# Patient Record
Sex: MALE | Race: WHITE | NOT HISPANIC OR LATINO
[De-identification: names, ages, dates, MRNs, and addresses within clinical notes are randomized per-mention and may not be internally consistent; named-entity substitution may affect disease eponyms.]

---

## 2023-03-10 ENCOUNTER — NON-APPOINTMENT (OUTPATIENT)
Age: 23
End: 2023-03-10

## 2023-03-10 ENCOUNTER — APPOINTMENT (OUTPATIENT)
Dept: NEUROLOGY | Facility: CLINIC | Age: 23
End: 2023-03-10
Payer: COMMERCIAL

## 2023-03-10 VITALS
HEART RATE: 67 BPM | DIASTOLIC BLOOD PRESSURE: 68 MMHG | TEMPERATURE: 98.3 F | RESPIRATION RATE: 16 BRPM | SYSTOLIC BLOOD PRESSURE: 113 MMHG

## 2023-03-10 DIAGNOSIS — R51.9 HEADACHE, UNSPECIFIED: ICD-10-CM

## 2023-03-10 DIAGNOSIS — S06.0X0A CONCUSSION W/OUT LOSS OF CONSCIOUSNESS, INITIAL ENCOUNTER: ICD-10-CM

## 2023-03-10 PROBLEM — Z00.00 ENCOUNTER FOR PREVENTIVE HEALTH EXAMINATION: Status: ACTIVE | Noted: 2023-03-10

## 2023-03-10 PROCEDURE — 99204 OFFICE O/P NEW MOD 45 MIN: CPT

## 2023-03-10 NOTE — HISTORY OF PRESENT ILLNESS
[FreeTextEntry1] : This 23 year old right handed man is self-referred for my advice and opinion regarding concussion\par \par He has no significant PMH.  Today was walking down stairs to basement and hit top of head on low hanging pipe.  He did not pass out, but felt woozy and needed to lay down at once.  He had photophobia, moderate intensity headache and trouble focusing for a time, but then improved. Now has dull headache 3/10 and mild residual photophobia.  Feels a bit woozy with position change.  \par \par \par PMH:  None\par \par PSH: none\par \par SHX:  college student.  Nonsmoker.  nondrinker.  \par \par FHX:  no neurologic issues.

## 2023-03-10 NOTE — PHYSICAL EXAM
[FreeTextEntry1] : \par Exam as below (with documented exceptions in parentheses):\par \par General:  Healthy appearing man well groomed and without deformities. KPS is 90\par \par Mental Status:  Awake, alert and attentive. Oriented to person, place and time. Recent and remote memory intact. Normal concentration. Fluent spontaneous speech with intact naming and repetition. Normal fund of knowledge.  \par \par Cranial Nerves: II: Full visual fields. III,IV,VI:Pupils round, reactive to light. Full extraocular movements. No nystagmus. V: Normal bilateral bite, facial sensation. VII: No facial weakness. VIII: Hearing intact. IX,X: Palate midline, intact gag. XI:  Sternocleidomastoids normal. XII: Tongue protrudes midline. No dysarthria. \par \par Motor:  Normal tone, bulk and power throughout including arms and legs, proximal and distal. No pronator drift. No abnormal movements. \par \par Sensation: Normal in arms, legs and trunk to pin, proprioception and vibration. Negative Romberg. \par \par Coordination: No dysmetria or dysdiadochokinesis bilaterally. Normal heel-shin testing. \par \par Gait: Normal including heel and toe walking. Normal station. \par \par Reflexes: Normoactive and symmetric throughout. Absent Babinski bilaterally. \par \par Vascular: No peripheral edema/calf tenderness. \par \par Eyes: Funduscopic exam without papilledema\par \par Heart: Regular rate and rhythm. Normal s1-s2. No murmurs, rubs or gallops. \par \par Respiratory: Lungs clear to auscultation bilaterally. \par \par Abdomen: Nontender, non-distended. No hepatosplenomegaly. Normal bowel sounds in four quadrants. \par \par

## 2023-03-10 NOTE — DISCUSSION/SUMMARY
[FreeTextEntry1] : Mild TBI, with post concussive symptoms.  We discussed management of symptoms and prevention of second injury including limiting screen time, sports and brain rest. I outlined indication for imaging (currently none), and expected resolution time.   I think he should avoid upcoming examinations at school this coming week. \par \par \par F/u PRN